# Patient Record
Sex: MALE | Race: WHITE
[De-identification: names, ages, dates, MRNs, and addresses within clinical notes are randomized per-mention and may not be internally consistent; named-entity substitution may affect disease eponyms.]

---

## 2018-05-10 ENCOUNTER — HOSPITAL ENCOUNTER (EMERGENCY)
Dept: HOSPITAL 89 - ER | Age: 63
Discharge: HOME | End: 2018-05-10
Payer: MEDICARE

## 2018-05-10 VITALS — DIASTOLIC BLOOD PRESSURE: 84 MMHG | SYSTOLIC BLOOD PRESSURE: 179 MMHG

## 2018-05-10 VITALS — WEIGHT: 315 LBS | BODY MASS INDEX: 56.58 KG/M2

## 2018-05-10 DIAGNOSIS — S91.011A: Primary | ICD-10-CM

## 2018-05-10 PROCEDURE — 99282 EMERGENCY DEPT VISIT SF MDM: CPT

## 2018-05-10 PROCEDURE — 90715 TDAP VACCINE 7 YRS/> IM: CPT

## 2018-05-10 PROCEDURE — 90471 IMMUNIZATION ADMIN: CPT

## 2018-05-10 NOTE — ER REPORT
History and Physical


Time Seen By MD:  16:46


HPI/ROS


CHIEF COMPLAINT: Laceration





HISTORY OF PRESENT ILLNESS: This is a 62-year-old male who presents to the 

emergency department for concerns of a laceration. Patient states that 

approximately 30-40 minutes prior to arrival he started bleeding from his right 

medial ankle, he wasn't sure if he lacerated his ankle on something and wasn't 

aware of it, patient states that the blood was "squirting out". Patient put a 

sock on the area wrapped it up with an Ace bandage. Upon arrival bleeding is 

controlled after the Ace wrap has been removed there is no bleeding, no 

laceration. Patient denies aches, chills, nausea, vomiting, diarrhea no chest 

pain or shortness breath.


Allergies:  


Coded Allergies:  


     morphine (Verified  Allergy, Mild, 5/10/18)


Home Meds


Active Scripts


Ibuprofen (IBUPROFEN) 800 Mg Tablet, 1 TAB PO Q8H for PAIN, #60 TAB


   Prov:LORENZA COREAS DO         8/10/16


Discontinued Reported Medications


[Blood Pressure ]   No Conflict Check


   8/9/16


Discontinued Scripts


Guaifenesin/Codeine (GUAIFENESIN-CODEINE SYRUP) 5 Ml Syrp, 5 ML PO Q6H Y for 

COUGH, #120 ML 0 Refills


   Prov:SHARLENE ACE MD         1/30/17


Past Medical/Surgical History


The patient has a past medical and surgical history of "mock stroke secondary 

to black mold", SVT cardiac ablation, hypertension, pneumonia, arthritis in the 

knees and hands, back pain, wears glasses, right knee and right wrist surgery.


Reviewed Nurses Notes:  Yes


Hx Smoking:  No


Smoking Status:  Never Smoker


Exposure to Second Hand Smoke?:  No


Hx Substance Use Disorder:  No


Hx Alcohol Use:  No


Constitutional





Vital Sign - Last 24 Hours








 5/10/18 5/10/18 5/10/18 5/10/18





 16:43 16:44 16:55 17:00


 


Temp 98.8   


 


Pulse 66  59 


 


Resp 18   


 


B/P (MAP) 181/89 181/89 (119)  170/91 (117)


 


Pulse Ox 93  93 


 


    





 5/10/18 5/10/18 5/10/18 5/10/18





 17:10 17:25 17:30 17:40


 


Pulse 55 57  55


 


B/P (MAP)   175/88 (117) 


 


Pulse Ox 92 91  93





 5/10/18 5/10/18  





 17:50 17:55  


 


Pulse  ???  


 


B/P (MAP) 179/84 (115)   








Physical Exam


   General appearance: Alert no distress.


Respiratory: Chest is non tender, lungs are clear to auscultation.


Cardiac: Regular rate and rhythm.


Integument: No laceration or open wound identified. It appears that a small 

varicose vein or spider vein ruptured and a small pinhole was bleeding however 

there is no active bleeding.








DIFFERENTIAL DIAGNOSIS: After history and physical exam differential diagnosis 

was considered for ruptured spider vein, ruptured varicose vein and laceration.





Medical Decision Making


ED Course/Re-evaluation


ED Course


The patient was admitted to room. A score obtained. On inspection there is no 

obvious laceration or trauma to the skin identified. No bleeding. We did 

monitor the area that the patient was concerned about 4 approximate 45 minutes 

no bleeding the area surrounding the wound was cleaned of the dried blood. A 

dressing was applied to the area of concern. Patient was encouraged to follow 

up with his primary care provider for any other concerns he may have. Patient 

was instructed to return to the emergency department for any other concerns or 

worsening symptoms. Patient expressed understanding and was in agreement with 

his chronic care and discharged home.


Decision to Disposition Date:  May 10, 2018


Decision to Disposition Time:  17:43





Depart


Departure


Latest Vital Signs





Vital Signs








  Date Time  Temp Pulse Resp B/P (MAP) Pulse Ox O2 Delivery O2 Flow Rate FiO2


 


5/10/18 17:55  ???      


 


5/10/18 17:50    179/84 (115)    


 


5/10/18 17:40     93   


 


5/10/18 16:43 98.8  18     








Impression:  


 Primary Impression:  


 Bleeding


Condition:  Improved


Disposition:  HOME OR SELF-CARE


Referrals:  


LISSY OSMAN (PCP)


Patient Instructions:  Acute Wound Care (ED)





Additional Instructions:  


Drink plenty of fluids.


Get plenty of rest.


Follow up with your primary care provider.


Return to the ED for any other concerns. 


Apply a pressure dressing if the wound begins to bleed again.











SARAH ALEXP-BC May 10, 2018 16:46

## 2018-09-23 ENCOUNTER — HOSPITAL ENCOUNTER (EMERGENCY)
Dept: HOSPITAL 89 - ER | Age: 63
Discharge: HOME | End: 2018-09-23
Payer: MEDICARE

## 2018-09-23 VITALS — SYSTOLIC BLOOD PRESSURE: 181 MMHG | DIASTOLIC BLOOD PRESSURE: 93 MMHG

## 2018-09-23 VITALS — WEIGHT: 315 LBS | BODY MASS INDEX: 56.58 KG/M2

## 2018-09-23 DIAGNOSIS — G51.0: Primary | ICD-10-CM

## 2018-09-23 LAB
INR PPP: 0.95
PLATELET COUNT, AUTOMATED: 344 K/UL (ref 150–450)

## 2018-09-23 PROCEDURE — 85730 THROMBOPLASTIN TIME PARTIAL: CPT

## 2018-09-23 PROCEDURE — 82565 ASSAY OF CREATININE: CPT

## 2018-09-23 PROCEDURE — 84075 ASSAY ALKALINE PHOSPHATASE: CPT

## 2018-09-23 PROCEDURE — 85610 PROTHROMBIN TIME: CPT

## 2018-09-23 PROCEDURE — 85025 COMPLETE CBC W/AUTO DIFF WBC: CPT

## 2018-09-23 PROCEDURE — 82374 ASSAY BLOOD CARBON DIOXIDE: CPT

## 2018-09-23 PROCEDURE — 70450 CT HEAD/BRAIN W/O DYE: CPT

## 2018-09-23 PROCEDURE — 84295 ASSAY OF SERUM SODIUM: CPT

## 2018-09-23 PROCEDURE — 84450 TRANSFERASE (AST) (SGOT): CPT

## 2018-09-23 PROCEDURE — 84155 ASSAY OF PROTEIN SERUM: CPT

## 2018-09-23 PROCEDURE — 82947 ASSAY GLUCOSE BLOOD QUANT: CPT

## 2018-09-23 PROCEDURE — 82247 BILIRUBIN TOTAL: CPT

## 2018-09-23 PROCEDURE — 84132 ASSAY OF SERUM POTASSIUM: CPT

## 2018-09-23 PROCEDURE — 82040 ASSAY OF SERUM ALBUMIN: CPT

## 2018-09-23 PROCEDURE — 84520 ASSAY OF UREA NITROGEN: CPT

## 2018-09-23 PROCEDURE — 82310 ASSAY OF CALCIUM: CPT

## 2018-09-23 PROCEDURE — 82435 ASSAY OF BLOOD CHLORIDE: CPT

## 2018-09-23 PROCEDURE — 99284 EMERGENCY DEPT VISIT MOD MDM: CPT

## 2018-09-23 PROCEDURE — 84460 ALANINE AMINO (ALT) (SGPT): CPT

## 2018-09-23 PROCEDURE — 71046 X-RAY EXAM CHEST 2 VIEWS: CPT

## 2018-09-23 PROCEDURE — 70030 X-RAY EYE FOR FOREIGN BODY: CPT

## 2018-09-23 NOTE — RADIOLOGY IMAGING REPORT
FACILITY: [facility] - [place], [place] 

 

PATIENT NAME: Pranav Rg

: 1955

MR: [id number]

V: [id number]

EXAM DATE: 

ORDERING PHYSICIAN: SHARLENE ACE

TECHNOLOGIST: 

 

Location: [facility] - [place]

Patient: Pranav Rg

: 1955

MRN: [id number]

Visit/Account:[id number]

Date of Sevice:  2018

 

ACCESSION #: [id number]

 

EXAMINATION: Chest 2 Views

 

HISTORY:  Shortness of breath.

 

COMPARISON:  2017.

 

FINDINGS:

Borderline cardiac enlargement with normal pulmonary vascularity.

 

The lungs are clear. No focal consolidation or pleural effusion. No pneumothorax.

 

Stable old right rib fractures. No acute osseous findings.

 

IMPRESSION:

1. Borderline cardiac enlargement, without evidence of active failure.

2. No other acute findings in the chest.

 

Report Dictated By: Jamie Schneider MD at 2018 4:39 PM

 

Report E-Signed By: Jamie Schneider MD  at 2018 4:40 PM

 

WSN:[id number]

## 2018-09-23 NOTE — RADIOLOGY IMAGING REPORT
FACILITY: [facility] 

 

PATIENT NAME: Pranav Rg

: 1955

MR: [id number]

V: [id number]

EXAM DATE: 

ORDERING PHYSICIAN: SHARLENE ACE

TECHNOLOGIST: 

 

Location: [facility]

Patient: Pranav Rg

: 1955

MRN: [id number]

Visit/Account:[id number]

Date of Sevice:  2018

 

ACCESSION #: [id number]

 

EXAMINATION: CT head without IV contrast

 

HISTORY: Right facial droop and slurred speech.

 

TECHNIQUE:   Axial CT images of the head were obtained from the vertex to the skull base without IV c
ontrast, with coronal and sagittal 2D reconstructed images.

One of the following dose optimization techniques was utilized in the performance of this exam: Autom
ated exposure control; adjustment of the mA and/or kV according to the patient's size; or use of an i
terative  reconstruction technique.  Specific details can be referenced in the facility's radiology C
T exam operational policy.

 

COMPARISON:   None.

 

FINDINGS:

The intracranial contents are unremarkable.  No CT evidence of intracranial hemorrhage, mass lesion, 
or acute infarct.  No midline shift or extra-axial fluid collections.  Gray-white differentiation is 
maintained.

 

The calvarium is intact.  The visualized paranasal sinuses and mastoid air cells are unopacified.

 

IMPRESSION:

No CT evidence of acute intracranial pathology. If there is continued clinical concern for acute isch
emia, follow-up MRI could be performed.

 

Report Dictated By: Jamie Schneider MD at 2018 3:40 PM

 

Report E-Signed By: Jamie Schneider MD  at 2018 3:46 PM

 

WSN:[id number]

## 2018-09-23 NOTE — RADIOLOGY IMAGING REPORT
[place] [facility] [place] 

 

PATIENT NAME: Pranav Rg

: 1955

MR: [id number]

V: [id number]

EXAM DATE: 

ORDERING PHYSICIAN: SHARLENE ACE

TECHNOLOGIST: 

 

Location: [facility]

Patient: Pranav Rg

: 1955

MRN: [id number]

Visit/Account:[id number]

Date of Sevice:  2018

 

ACCESSION #: [id number]

 

EXAMINATION: Single frontal view of the orbits

 

HISTORY: MRI screening

 

COMPARISON: None.

 

FINDINGS:

No metallic or other radiopaque foreign bodies are visualized in the region of the bony orbits.  Visu
alized osseous structures are unremarkable on this single view.

 

IMPRESSION:

Exam is negative for orbital metal.

 

Report Dictated By: Jamie Schneider MD at 2018 4:41 PM

 

Report E-Signed By: Jamie Schneider MD  at 2018 4:41 PM

 

WSN:[id number]

## 2018-09-23 NOTE — ER REPORT
History and Physical


Time Seen By MD:  15:07


Hx. of Stated Complaint:  


pt c/o sob, thinks it may be from smoke. c/o pain r post neck, states r side of 


face is not as strong for 5 days


HPI/ROS


CHIEF COMPLAINT: Shortness of breath and concern for facial weakness





HISTORY OF PRESENT ILLNESS: This is a 62-year-old male. He has shortness of 


breath and thinks that this is due to the smoke. He says that he has had about 


1.5 hours of facial weakness. Some mild slurred speech. He says that when trying


to drink, he has some trouble with the right corner of his mouth.. He cannot 


close his right eye tightly, but has not noted increased tearing. No fevers or 


chills. No increased cough. Has chronic problems in his legs, due to old 


injuries, but no new numbness or weakness. Has had a "mock stroke" in the past 


due to black mold, no residual problems and had normal imaging including MRI at 


that time.





REVIEW OF SYSTEMS:


As above.


Allergies:  


Coded Allergies:  


     morphine (Verified  Allergy, Mild, 9/23/18)


Home Meds


Active Scripts


Diltiazem Hcl (DILTIAZEM ER) 240 Mg Capsule.er, 240 MG PO QDAY, #60 CAP.SR.24H 0


Refills


   Prov:SHARLENE ACE MD         9/23/18


Valacyclovir Hcl (VALACYCLOVIR) 1,000 Mg Tablet, 1000 MG PO TID for 7 Days, #21 


TAB 0 Refills


   Prov:SHARLENE ACE MD         9/23/18


Prednisone (PREDNISONE) 20 Mg Tablet, 20 MG PO AS DIRECTED, #23 TAB 0 Refills


   Take 3 tablets once a day for 5 days,


   then take 2 1/2 tablets once a day for 1 day,


   then take 2 tablets once a day for 1 day,


   then take 1 1/2 tablets once a day for 1 day,


   then take 1 tablet once a day for 1 day,


   then take 1/2 tablet once a day for 1 day,


   then stop


   Prov:SHARLENE ACE MD         9/23/18


Ibuprofen (IBUPROFEN) 800 Mg Tablet, 1 TAB PO Q8H for PAIN, #60 TAB


   Prov:LORENZA COREAS DO         8/10/16


Reported Medications


Acetaminophen (TYLENOL) 325 Mg Tablet, 325 MG PO, TAB


   9/23/18


Reviewed Nurses Notes:  Yes


Hx Smoking:  No


Smoking Status:  Never Smoker


Exposure to Second Hand Smoke?:  No


Hx Substance Use Disorder:  No


Hx Alcohol Use:  No


Constitutional





Vital Sign - Last 24 Hours








 9/23/18 9/23/18 9/23/18 9/23/18





 14:52 14:53 15:00 15:18


 


Temp 98.4   


 


Pulse 89   68


 


Resp 20   21


 


B/P (MAP) 180/90 180/96 (124) 173/80 (111) 


 


Pulse Ox 95   91


 


O2 Delivery Room Air   


 


    





 9/23/18 9/23/18 9/23/18 9/23/18





 15:33 15:38 15:40 16:00


 


Pulse  81  


 


Resp  12  


 


B/P (MAP) 171/94 (119)   181/93 (122)


 


Pulse Ox  90  


 


O2 Flow Rate   2.0 





 9/23/18 9/23/18  





 16:08 17:08  


 


Pulse 61 ???  


 


Resp 15   


 


Pulse Ox 96   








Physical Exam


   General Appearance: The patient is alert. No acute distress. Non-toxic in a


ppearance.


Eyes: Pupils are equal, round. Reactive to light. No pallor, injection or 


icterus. Extraocular movements are intact. Normal visual fields. Cannot close 


the right eye tightly.


ENT: Mucous membranes are moist. Normal oral mucosa. Posterior oropharynx is 


normal. Normal nasal mucosa. Normal tympanic membranes and canals. Facial droop 


is mild on the right.


Neck: Supple and non tender.


Respiratory: Lungs are clear to auscultation.


Cardiovascular: Regular rate and rhythm. No murmurs, gallops or rubs. Normal 


capillary refill. No edema.


Gastrointestinal:  Abdomen is soft and non tender. Nondistended. Normal active 


bowel sounds.


Neurological: Alert and oriented x3. Cranial nerves with eye exam as noted 


above. Midline tongue and symmetric palate elevation. Facial droop as noted. 


Normal facial sensation. Normal strength which is equal in the extremities. 


Normal sensation in extremities as well.


Skin: Warm and dry. No rashes.


Musculoskeletal: Extremities are nontender. No tenderness in palpation of the 


cervical, thoracic and lumbar spine. Has some pain in the right base of the 


skull.





DIFFERENTIAL DIAGNOSIS: After history and physical exam, differential diagnosis 


was considered for initially with complaint of shortness of breath, but on 


further questioning, became apparent that we needed to focus on the neurologic 


deficit. Sent to CT scan for non-contrast head CT and then got the telestroke 


activated for further evaluation. Based on his symptoms, I feel that this is 


most likely a Bell's Palsy, but  will need to do NIHSS and consult neurology.





Medical Decision Making


Data Points


Result Diagram:  


9/23/18 1503                                                                    


           9/23/18 1503





Laboratory





Hematology








Test


 9/23/18


15:03


 


Red Blood Count


 5.74 M/uL


(4.00-5.60)


 


Mean Corpuscular Volume


 80.3 fL


(80.0-96.0)


 


Mean Corpuscular Hemoglobin


 26.4 pg


(26.0-33.0)


 


Mean Corpuscular Hemoglobin


Concent 33.0 g/dL


(32.0-36.0)


 


Red Cell Distribution Width


 14.6 %


(11.5-14.5)


 


Mean Platelet Volume


 8.7 fL


(7.2-11.1)


 


Neutrophils (%) (Auto)


 65.7 %


(39.4-72.5)


 


Lymphocytes (%) (Auto)


 22.7 %


(17.6-49.6)


 


Monocytes (%) (Auto)


 7.7 %


(4.1-12.4)


 


Eosinophils (%) (Auto)


 2.8 %


(0.4-6.7)


 


Basophils (%) (Auto)


 1.1 %


(0.3-1.4)


 


Nucleated RBC Relative Count


(auto) 0.1 /100WBC 





 


Neutrophils # (Auto)


 5.6 K/uL


(2.0-7.4)


 


Lymphocytes # (Auto)


 1.9 K/uL


(1.3-3.6)


 


Monocytes # (Auto)


 0.7 K/uL


(0.3-1.0)


 


Eosinophils # (Auto)


 0.2 K/uL


(0.0-0.5)


 


Basophils # (Auto)


 0.1 K/uL


(0.0-0.1)


 


Nucleated RBC Absolute Count


(auto) 0.01 K/uL 





 


Prothrombin Time


 12.7 seconds


(12.0-14.4)


 


Prothromb Time International


Ratio 0.95 





 


Activated Partial


Thromboplast Time 28 seconds


(23-35)


 


Sodium Level


 141 mmol/L


(137-145)


 


Potassium Level


 3.5 mmol/L


(3.5-5.0)


 


Chloride Level


 101 mmol/L


()


 


Carbon Dioxide Level


 27 mmol/L


(22-30)


 


Blood Urea Nitrogen 7 mg/dl (9-21) 


 


Creatinine


 0.60 mg/dl


(0.66-1.25)


 


Glomerular Filtration Rate


Calc > 60.0 





 


Random Glucose


 102 mg/dl


()


 


Calcium Level


 8.9 mg/dl


(8.4-10.2)


 


Total Bilirubin


 0.6 mg/dl


(0.2-1.3)


 


Aspartate Amino Transf


(AST/SGOT) 28 U/L (0-35) 





 


Alanine Aminotransferase


(ALT/SGPT) 26 U/L (0-56) 





 


Alkaline Phosphatase 66 U/L (0-126) 


 


Total Protein


 8.3 g/dl


(6.3-8.2)


 


Albumin


 4.3 g/dl


(3.5-5.0)








Chemistry








Test


 9/23/18


15:03


 


White Blood Count


 8.6 k/uL


(4.5-11.0)


 


Red Blood Count


 5.74 M/uL


(4.00-5.60)


 


Hemoglobin


 15.2 g/dL


(14.0-18.0)


 


Hematocrit


 46.1 %


(42.0-52.0)


 


Mean Corpuscular Volume


 80.3 fL


(80.0-96.0)


 


Mean Corpuscular Hemoglobin


 26.4 pg


(26.0-33.0)


 


Mean Corpuscular Hemoglobin


Concent 33.0 g/dL


(32.0-36.0)


 


Red Cell Distribution Width


 14.6 %


(11.5-14.5)


 


Platelet Count


 344 K/uL


(150-450)


 


Mean Platelet Volume


 8.7 fL


(7.2-11.1)


 


Neutrophils (%) (Auto)


 65.7 %


(39.4-72.5)


 


Lymphocytes (%) (Auto)


 22.7 %


(17.6-49.6)


 


Monocytes (%) (Auto)


 7.7 %


(4.1-12.4)


 


Eosinophils (%) (Auto)


 2.8 %


(0.4-6.7)


 


Basophils (%) (Auto)


 1.1 %


(0.3-1.4)


 


Nucleated RBC Relative Count


(auto) 0.1 /100WBC 





 


Neutrophils # (Auto)


 5.6 K/uL


(2.0-7.4)


 


Lymphocytes # (Auto)


 1.9 K/uL


(1.3-3.6)


 


Monocytes # (Auto)


 0.7 K/uL


(0.3-1.0)


 


Eosinophils # (Auto)


 0.2 K/uL


(0.0-0.5)


 


Basophils # (Auto)


 0.1 K/uL


(0.0-0.1)


 


Nucleated RBC Absolute Count


(auto) 0.01 K/uL 





 


Prothrombin Time


 12.7 seconds


(12.0-14.4)


 


Prothromb Time International


Ratio 0.95 





 


Activated Partial


Thromboplast Time 28 seconds


(23-35)


 


Glomerular Filtration Rate


Calc > 60.0 





 


Calcium Level


 8.9 mg/dl


(8.4-10.2)


 


Total Bilirubin


 0.6 mg/dl


(0.2-1.3)


 


Aspartate Amino Transf


(AST/SGOT) 28 U/L (0-35) 





 


Alanine Aminotransferase


(ALT/SGPT) 26 U/L (0-56) 





 


Alkaline Phosphatase 66 U/L (0-126) 


 


Total Protein


 8.3 g/dl


(6.3-8.2)


 


Albumin


 4.3 g/dl


(3.5-5.0)








Coagulation








Test


 9/23/18


15:03


 


Prothrombin Time 12.7 seconds 


 


Prothromb Time International


Ratio 0.95 





 


Activated Partial


Thromboplast Time 28 seconds 














EKG/Imaging


Imaging


EXAMINATION: CT head without IV contrast


 


HISTORY: Right facial droop and slurred speech.


 


TECHNIQUE:   Axial CT images of the head were obtained from the vertex to the 


skull base without IV contrast, with coronal and sagittal 2D reconstructed 


images.


One of the following dose optimization techniques was utilized in the 


performance of this exam: Automated exposure control; adjustment of the mA 


and/or kV according to the patient's size; or use of an iterative  


reconstruction technique.  Specific details can be referenced in the facility's 


radiology CT exam operational policy.


 


COMPARISON:   None.


 


FINDINGS:


The intracranial contents are unremarkable.  No CT evidence of intracranial 


hemorrhage, mass lesion, or acute infarct.  No midline shift or extra-axial 


fluid collections.  Gray-white differentiation is maintained.


 


The calvarium is intact.  The visualized paranasal sinuses and mastoid air cells


are unopacified.


 


IMPRESSION:


No CT evidence of acute intracranial pathology. If there is continued clinical 


concern for acute ischemia, follow-up MRI could be performed.


 


Report Dictated By: Jamie Schneider MD at 9/23/2018 3:40 PM








EXAMINATION: Chest 2 Views


 


HISTORY:  Shortness of breath.


 


COMPARISON:  1/30/2017.


 


FINDINGS:


Borderline cardiac enlargement with normal pulmonary vascularity.


 


The lungs are clear. No focal consolidation or pleural effusion. No 


pneumothorax.


 


Stable old right rib fractures. No acute osseous findings.


 


IMPRESSION:


1. Borderline cardiac enlargement, without evidence of active failure.


2. No other acute findings in the chest.


 


Report Dictated By: Jamie Schneider MD at 9/23/2018 4:39 PM





ED Course/Re-evaluation


Clinical Indication for ER IV:  Hydration, IV Access


ED Course


The patient had the CT scan done and then I performed an NIH stroke scale with 


Dr. Lemus on the Telestroke device from [facility]. After this was 


done, scored one point for the slight loss of nasolabial fold. Dr. Lemus agreed 


that this looked like an early [place] Palsy. We discussed using steroid to treat 


this. Discussed the option of performing an MRI and after discussion with the 


patient, we did go ahead and order an MRI of the brain without contrast, but the


patient was unable to fit in the MRI due to body size. We are fairly certain thi


s is [place] Palsy, so went ahead with treatment. We did talk about where open MRI


is available should the patient want to do this. Started Prednisone and 


Valacyclovir. Discussed treatment and protection of the eye as well.


Decision to Disposition Date:  Sep 23, 2018


Decision to Disposition Time:  16:59





Depart


Departure


Latest Vital Signs





Vital Signs








  Date Time  Temp Pulse Resp B/P (MAP) Pulse Ox O2 Delivery O2 Flow Rate FiO2


 


9/23/18 17:08  ???      


 


9/23/18 16:08   15  96   


 


9/23/18 16:00    181/93 (122)    


 


9/23/18 15:40       2.0 


 


9/23/18 14:52 98.4     Room Air  








Impression:  


   Primary Impression:  


   Bell's palsy


Condition:  Improved


Disposition:  HOME OR SELF-CARE


Referrals:  


LISSY OSMANP (PCP)


New Scripts


Diltiazem Hcl (DILTIAZEM ER) 240 Mg Capsule.er


240 MG PO QDAY, #60 CAP.SR.24H 0 Refills


   Prov: SHARLENE ACE MD         9/23/18 


Valacyclovir Hcl (VALACYCLOVIR) 1,000 Mg Tablet


1000 MG PO TID for 7 Days, #21 TAB 0 Refills


   Prov: SHARLENE ACE MD         9/23/18 


Prednisone (PREDNISONE) 20 Mg Tablet


20 MG PO AS DIRECTED, #23 TAB 0 Refills


   Take 3 tablets once a day for 5 days,


   then take 2 1/2 tablets once a day for 1 day,


   then take 2 tablets once a day for 1 day,


   then take 1 1/2 tablets once a day for 1 day,


   then take 1 tablet once a day for 1 day,


   then take 1/2 tablet once a day for 1 day,


   then stop


   Prov: SHARLENE ACE MD         9/23/18


Patient Instructions:  [place] Palsy (ED)





Additional Instructions:  


Start Prednisone: 20mg tablets, take 3 tablets once a day for 5 days, then 2 1/2


tablets for 1 days, then 2 tablets for 1 day, then 1 1/2 tablets for 1 days, 


then 1 tablet for 1 days, then 1/2 tablet for 1 day, then stop. (23 tablets)


Take the antiviral medication Valacyclovir 1000mg three times a day for 1 week.


The droop of the eyelid and incomplete closure can cause eye problems. We 


recommend using lubricating eye drops throughout the day. You can also consider 


using drops with a patch as well, especially at night to protect the eye.


Please follow-up with your regular doctor for follow-up. Would recommend seeing 


them in the next 1-2 weeks.











SHARLENE ACE MD             Sep 23, 2018 15:07

## 2018-11-14 ENCOUNTER — HOSPITAL ENCOUNTER (EMERGENCY)
Dept: HOSPITAL 89 - ER | Age: 63
Discharge: HOME | End: 2018-11-14
Payer: MEDICARE

## 2018-11-14 VITALS — DIASTOLIC BLOOD PRESSURE: 76 MMHG | SYSTOLIC BLOOD PRESSURE: 135 MMHG

## 2018-11-14 VITALS — BODY MASS INDEX: 56.58 KG/M2

## 2018-11-14 DIAGNOSIS — M17.11: Primary | ICD-10-CM

## 2018-11-14 PROCEDURE — 99284 EMERGENCY DEPT VISIT MOD MDM: CPT

## 2018-11-14 NOTE — ER REPORT
History and Physical


Time Seen By MD:  12:00


Hx. of Stated Complaint:  


PATIENT REPORTS THAT HE HAS BEEN HAVING KNEE PROBLEMS SINCE 1995. HE REPORTS 


THAT ON SUNDAY HIS RIGHT KNEE STARTED SWELLING AND IS CAUSING HIM QUITE A BIT OF





PAIN


HPI/ROS


CHIEF COMPLAINT: Right leg pain





HISTORY OF PRESENT ILLNESS: 8-year-old morbidly obese male long history of 


chronic knee pain comes emergency Department today with complaint of pain behind


his right knee and down and this This going on for the last 3-4 days patient 


states the pain is dull and aching occasionally sharp and stabbing localized to 


the right posterior popliteal fossa and the right calf area patient denies any 


falls or trauma says he ambulates but not as much as he used to due to his 


obesity and chronic knee pain A she has no history of falls or recent trauma no 


additional complaints noted





REVIEW OF SYSTEMS:


Respiratory: No cough, no dyspnea.


Cardiovascular: No chest pain, no palpitations.


Gastrointestinal: No vomiting, no abdominal pain.


Musculoskeletal: Right knee pain


Remainder of the 14 system rev:  Yes


Allergies:  


Coded Allergies:  


     morphine (Verified  Allergy, Mild, 9/23/18)


Home Meds


Active Scripts


Diltiazem Hcl (DILTIAZEM ER) 240 Mg Capsule.er, 240 MG PO QDAY, #60 CAP.SR.[id number] 0


Refills


   Prov:SHARLENE ACE MD         9/23/18


Valacyclovir Hcl (VALACYCLOVIR) 1,000 Mg Tablet, 1000 MG PO TID for 7 Days, #21 


TAB 0 Refills


   Prov:SHARLENE ACE MD         9/23/18


Prednisone (PREDNISONE) 20 Mg Tablet, 20 MG PO AS DIRECTED, #23 TAB 0 Refills


   Take 3 tablets once a day for 5 days,


   then take 2 1/2 tablets once a day for 1 day,


   then take 2 tablets once a day for 1 day,


   then take 1 1/2 tablets once a day for 1 day,


   then take 1 tablet once a day for 1 day,


   then take 1/2 tablet once a day for 1 day,


   then stop


   Prov:SHARLENE ACE MD         9/23/18


Ibuprofen (IBUPROFEN) 800 Mg Tablet, 1 TAB PO Q8H for PAIN, #60 TAB


   Prov:LORENZA COREAS DO         8/10/16


Reported Medications


Acetaminophen (TYLENOL) 325 Mg Tablet, 325 MG PO, TAB


   9/23/18


Reviewed Nurses Notes:  Yes


Old Medical Records Reviewed:  Yes


Hx Smoking:  No


Smoking Status:  Never Smoker


Exposure to Second Hand Smoke?:  No


Hx Substance Use Disorder:  No


Hx Alcohol Use:  No


Constitutional





Vital Sign - Last 24 Hours








 11/14/18 11/14/18





 11:53 12:00


 


Temp  98.1


 


Pulse 74 


 


Resp 20 


 


B/P (MAP) 102/84 


 


Pulse Ox 95 


 


O2 Delivery Room Air 








Physical Exam


   General appearance: Alert no distress.


Respiratory: Chest is non tender, lungs are clear to auscultation.


Cardiac: Regular rate and rhythm [ ]


Right leg examination patient with no pitting edema patient has pain with 


flexion and extension of the right knee neurovascularly intact pain with 


palpation to the popliteal fossa on the no firmness Diameter appears apparent no


additional findings of note


DIFFERENTIAL DIAGNOSIS: After history and physical exam differential diagnosis 


was considered for ultrasound chronic knee pain with radiculopathy DVT





Medical Decision Making


ED Course/Re-evaluation


ED Course


ED clinical course 8-year-old male with right posterior popliteal and calf pain 


ultrasound was negative no indication for emergent imaging as his story has no 


history of trauma his chronic knee discomfort with this is just an exacerbation 


of chronic underlying degenerative joint disease follow-up with orthopedics


Decision to Disposition Date:  Nov 14, 2018


Decision to Disposition Time:  12:47





Depart


Departure


Latest Vital Signs





Vital Signs








  Date Time  Temp Pulse Resp B/P (MAP) Pulse Ox O2 Delivery O2 Flow Rate FiO2


 


11/14/18 12:00 98.1       


 


11/14/18 11:53  74 20 102/84 95 Room Air  








Impression:  


   Primary Impression:  


   Degenerative joint disease


Condition:  Improved


Disposition:  HOME OR SELF-CARE


Referrals:  


LISSY OSMAN (PCP)











ASHLEIGH NUNEZ MD


5 Days


Patient Instructions:  Knee Pain (ED)











SCAR GAMEZ MD           Nov 14, 2018 12:15

## 2018-11-14 NOTE — RADIOLOGY IMAGING REPORT
FACILITY: [facility] [place] 

 

PATIENT NAME: Pranav Rg

: 1955

MR: [id number]

V: [id number]

EXAM DATE: 

ORDERING PHYSICIAN: SCAR GAMEZ

TECHNOLOGIST: 

 

Location: [facility]

Patient: Pranav Rg

: 1955

MRN: [id number]

Visit/Account:[id number]

Date of Sevice: 2018

 

ACCESSION #: [id number]

 

VENOUS DOPP LOW RIGHT EXTREMIT

 

HISTORY:  Right leg pain, numbness

 

COMPARISON:  None.

 

FINDINGS:

Grayscale, duplex and color Doppler interrogation of the right lower extremity deep veins from common
 femoral vein to proximal calf was completed. The greater saphenous vein in the right proximal thigh 
was evaluated using similar technique.

 

Common femoral vein - Negative.

Femoral vein - Negative.

Deep femoral vein - Negative.

Popliteal vein - Negative.

Visualized deep calf veins - Negative.

 

Popliteal fossa: Negative.

Greater saphenous vein in the proximal thigh: Negative.

 

IMPRESSION:

1.  No evidence for right lower extremity DVT.

 

Report Dictated By: Thomas Dunphy, MD at 2018 12:59 PM

 

Report E-Signed By: Thomas Dunphy, MD  at 2018 1:03 PM

 

WSN:[id number]

## 2019-04-04 ENCOUNTER — HOSPITAL ENCOUNTER (EMERGENCY)
Dept: HOSPITAL 89 - ER | Age: 64
Discharge: HOME | End: 2019-04-04
Payer: MEDICARE

## 2019-04-04 VITALS — DIASTOLIC BLOOD PRESSURE: 80 MMHG | SYSTOLIC BLOOD PRESSURE: 167 MMHG

## 2019-04-04 VITALS — BODY MASS INDEX: 56.58 KG/M2 | WEIGHT: 315 LBS

## 2019-04-04 DIAGNOSIS — R79.89: ICD-10-CM

## 2019-04-04 DIAGNOSIS — M25.561: ICD-10-CM

## 2019-04-04 DIAGNOSIS — M25.562: Primary | ICD-10-CM

## 2019-04-04 DIAGNOSIS — G89.29: ICD-10-CM

## 2019-04-04 LAB — PLATELET COUNT, AUTOMATED: 276 K/UL (ref 150–450)

## 2019-04-04 PROCEDURE — 84450 TRANSFERASE (AST) (SGOT): CPT

## 2019-04-04 PROCEDURE — 85379 FIBRIN DEGRADATION QUANT: CPT

## 2019-04-04 PROCEDURE — 82310 ASSAY OF CALCIUM: CPT

## 2019-04-04 PROCEDURE — 85025 COMPLETE CBC W/AUTO DIFF WBC: CPT

## 2019-04-04 PROCEDURE — 82374 ASSAY BLOOD CARBON DIOXIDE: CPT

## 2019-04-04 PROCEDURE — 82040 ASSAY OF SERUM ALBUMIN: CPT

## 2019-04-04 PROCEDURE — 84132 ASSAY OF SERUM POTASSIUM: CPT

## 2019-04-04 PROCEDURE — 82947 ASSAY GLUCOSE BLOOD QUANT: CPT

## 2019-04-04 PROCEDURE — 83880 ASSAY OF NATRIURETIC PEPTIDE: CPT

## 2019-04-04 PROCEDURE — 99284 EMERGENCY DEPT VISIT MOD MDM: CPT

## 2019-04-04 PROCEDURE — 82565 ASSAY OF CREATININE: CPT

## 2019-04-04 PROCEDURE — 84155 ASSAY OF PROTEIN SERUM: CPT

## 2019-04-04 PROCEDURE — 84460 ALANINE AMINO (ALT) (SGPT): CPT

## 2019-04-04 PROCEDURE — 93970 EXTREMITY STUDY: CPT

## 2019-04-04 PROCEDURE — 82247 BILIRUBIN TOTAL: CPT

## 2019-04-04 PROCEDURE — 82435 ASSAY OF BLOOD CHLORIDE: CPT

## 2019-04-04 PROCEDURE — 84295 ASSAY OF SERUM SODIUM: CPT

## 2019-04-04 PROCEDURE — 84520 ASSAY OF UREA NITROGEN: CPT

## 2019-04-04 PROCEDURE — 84075 ASSAY ALKALINE PHOSPHATASE: CPT

## 2019-04-04 NOTE — RADIOLOGY IMAGING REPORT
FACILITY: [facility] 

 

PATIENT NAME: Pranav Rg

: 1955

MR: [id number]

V: [id number]

EXAM DATE: 

ORDERING PHYSICIAN: MARY COREA

TECHNOLOGIST: 

 

[place] [facility] [place]: Pranav Rg

: 1955

MRN: [id number]

Visit/Account:[id number]

Date of Sevice:  2019

 

ACCESSION #: [id number]

 

US VENOGRAM EXTREMITY, BILATERAL

 

ADDITIONAL PERTINENT HISTORY:  Bilateral popliteal pain with right thigh pain..

 

COMPARISON STUDIES:   None.

 

FINDINGS:

Grayscale compression, duplex and color Doppler interrogation of the bilateral lower extremity deep v
eins from common femoral vein to proximal calf was performed. The greater saphenous vein in the ipsil
ateral proximal thigh was evaluated using similar technique.

 

Bilateral lower extremity:

Common femoral vein:  Negative.

Femoral vein:  Negative.

Deep femoral vein:  Negative.

Popliteal vein:  Negative.

Visualized deep calf veins  Negative.

 

Greater saphenous vein in the proximal thigh:  Negative.

Popliteal fossa: Anechoic structure measuring 2.4 x 2.8 x 3.5 cm in the region of the left popliteal 
fossa compatible with a small Baker's cyst cyst

 

IMPRESSION:

1. No evidence of deep venous thrombosis involving the bilateral lower extremity.

2. Left-sided Baker's cyst as detailed above.

 

Report Dictated By: Zenon Pettit MD at 2019 3:19 AM

 

Report E-Signed By: Zenon Pettit MD  at 2019 3:21 AM

 

WSN:[id number]

## 2019-04-04 NOTE — RADIOLOGY IMAGING REPORT
FACILITY: [facility] [place] 

 

PATIENT NAME: Pranav Rg

: 1955

MR: [id number]

V: [id number]

EXAM DATE: 

ORDERING PHYSICIAN: MARY COREA

TECHNOLOGIST: 

 

Location: [facility] [place]

Patient: Pranav Rg

: 1955

MRN: [id number]

Visit/Account:[id number]

Date of Sevice:  2019

 

ACCESSION #: [id number]

 

 

Bilateral knees:

 

Indication: Chronic pain.

Technique: 3 views of each knee were obtained.

Comparison: 2016

 

Findings: There is no evidence of fracture, dislocation, or other acute deformity. There is moderate/
marked osteoarthritic joint space narrowing in both knees, right slightly worse than left. Marginal o
steophyte formation is evident on both sides. There is normal mineralization of the skeletal structur
es. There is no evidence of erosion or bone destruction. No joint effusion or periarticular soft tiss
ue abnormality is identified.

 

IMPRESSION: Bilateral moderate/marked osteoarthritis. No acute deformity.

 

Report Dictated By: Keven Conde MD at 2019 2:10 AM

 

Report E-Signed By: Keven Conde MD  at 2019 2:16 AM

 

WSN:[id number]

## 2019-04-05 ENCOUNTER — HOSPITAL ENCOUNTER (OUTPATIENT)
Dept: HOSPITAL 89 - US | Age: 64
End: 2019-04-05
Attending: INTERNAL MEDICINE
Payer: MEDICARE

## 2019-04-05 VITALS — BODY MASS INDEX: 56.58 KG/M2

## 2019-04-05 DIAGNOSIS — I51.7: Primary | ICD-10-CM

## 2019-04-05 PROCEDURE — 93306 TTE W/DOPPLER COMPLETE: CPT

## 2019-04-19 ENCOUNTER — HOSPITAL ENCOUNTER (OUTPATIENT)
Dept: HOSPITAL 89 - LAB | Age: 64
End: 2019-04-19
Attending: NURSE PRACTITIONER
Payer: MEDICARE

## 2019-04-19 VITALS — BODY MASS INDEX: 56.58 KG/M2

## 2019-04-19 DIAGNOSIS — I51.7: ICD-10-CM

## 2019-04-19 DIAGNOSIS — I10: Primary | ICD-10-CM

## 2019-04-19 DIAGNOSIS — I50.30: ICD-10-CM

## 2019-04-19 DIAGNOSIS — R60.0: ICD-10-CM

## 2019-04-19 DIAGNOSIS — Z79.899: ICD-10-CM

## 2019-04-19 PROCEDURE — 84132 ASSAY OF SERUM POTASSIUM: CPT

## 2019-04-19 PROCEDURE — 84450 TRANSFERASE (AST) (SGOT): CPT

## 2019-04-19 PROCEDURE — 84075 ASSAY ALKALINE PHOSPHATASE: CPT

## 2019-04-19 PROCEDURE — 82435 ASSAY OF BLOOD CHLORIDE: CPT

## 2019-04-19 PROCEDURE — 84460 ALANINE AMINO (ALT) (SGPT): CPT

## 2019-04-19 PROCEDURE — 82565 ASSAY OF CREATININE: CPT

## 2019-04-19 PROCEDURE — 82310 ASSAY OF CALCIUM: CPT

## 2019-04-19 PROCEDURE — 82040 ASSAY OF SERUM ALBUMIN: CPT

## 2019-04-19 PROCEDURE — 82247 BILIRUBIN TOTAL: CPT

## 2019-04-19 PROCEDURE — 84155 ASSAY OF PROTEIN SERUM: CPT

## 2019-04-19 PROCEDURE — 82947 ASSAY GLUCOSE BLOOD QUANT: CPT

## 2019-04-19 PROCEDURE — 36415 COLL VENOUS BLD VENIPUNCTURE: CPT

## 2019-04-19 PROCEDURE — 82374 ASSAY BLOOD CARBON DIOXIDE: CPT

## 2019-04-19 PROCEDURE — 84295 ASSAY OF SERUM SODIUM: CPT

## 2019-04-19 PROCEDURE — 84520 ASSAY OF UREA NITROGEN: CPT

## 2019-04-23 ENCOUNTER — HOSPITAL ENCOUNTER (OUTPATIENT)
Dept: HOSPITAL 89 - RESP | Age: 64
End: 2019-04-23
Attending: INTERNAL MEDICINE
Payer: MEDICARE

## 2019-04-23 VITALS — BODY MASS INDEX: 56.58 KG/M2

## 2019-04-23 DIAGNOSIS — I48.3: Primary | ICD-10-CM

## 2019-04-23 PROCEDURE — 93226 XTRNL ECG REC<48 HR SCAN A/R: CPT

## 2019-04-23 PROCEDURE — 93225 XTRNL ECG REC<48 HRS REC: CPT

## 2019-04-26 NOTE — RT HOLTER TEST
FACILITY: [facility] 

 

PATIENT NAME: PAZ POWELL

: [id number]

MR: [id number]

V: [id number]

EXAM DATE: 

ORDERING PHYSICIAN: YAKELIN BARROSO

TECHNOLOGIST: JULIO

 

Hook-up date: 2019   13:30:00       Duration: 47:59:00

Test Indications: 

Medications: 

              

              

              

              

              

              

              

[id number] QRS complexes

   349 Ventricular      ectopics which represent    <1 % of total QRS comp.

   295 Supraventricular ectopics which represent    <1 % of total QRS comp.

     * Paced QRS complexes       which represent  **** % of total QRS comp.

VENTRICULAR ECTOPY

   345 Isolated

    47 Bigeminal Cycles

     2 Couplets

     0 Runs

     0 Beats in Runs

     * Beats LONGEST at   *  BPM at **:**:** ****-**-**

     * Beats FASTEST at   *  BPM at **:**:** ****-**-**

SUPRAVENTRICULAR ECTOPY

   239 Isolated

    15 Couplets

     7 Runs

    26 Beats in Runs

     7 Beats LONGEST at  68  BPM at 09:53:39 2019

     3 Beats FASTEST at 116  BPM at 14:18:22 2019

HEART RATES

    39 MIN at 08:04:21 2019

    60 AVG

   108 MAX at 23:29:36 2019

LONGEST RR

     1.800 secs at 07:36:05 2019

S-T LEVELS

     Channel 1

          -12.800 mm MIN at 13:30:00 2019

          -12.800 mm MAX at 13:30:00 2019

     Channel 2

          -12.800 mm MIN at 13:30:00 2019

          -12.800 mm MAX at 13:30:00 2019

     Channel 3

          -12.800 mm MIN at 13:30:00 2019

          -12.800 mm MAX at 13:30:00 2019

 

Study dominated by sinus rhythm.

Infrequent PVC occuring in isolation and bigeminy.

Infrequent SVE  occuring in primarily in isolation, run up to 7 beats noted.

Negative holter monitor.

Confirmed by Honorio Carr ([id number]) on 2019 6:33:43 AM

 

Referred By:             Overread By: Honorio Christiansen

## 2019-06-19 ENCOUNTER — HOSPITAL ENCOUNTER (OUTPATIENT)
Dept: HOSPITAL 89 - NUC | Age: 64
End: 2019-06-19
Attending: INTERNAL MEDICINE
Payer: MEDICARE

## 2019-06-19 VITALS — BODY MASS INDEX: 56.58 KG/M2

## 2019-06-19 DIAGNOSIS — R94.39: Primary | ICD-10-CM

## 2019-06-19 PROCEDURE — 93017 CV STRESS TEST TRACING ONLY: CPT

## 2019-06-19 PROCEDURE — 78452 HT MUSCLE IMAGE SPECT MULT: CPT

## 2019-06-19 NOTE — RADIOLOGY IMAGING REPORT
FACILITY: [facility] [place] 

 

PATIENT NAME: Pranav Rg

: 1955

MR: [id number]

V: [id number]

EXAM DATE: [id number]

ORDERING PHYSICIAN: YAKELIN BARROSO

TECHNOLOGIST: 

 

Location: [facility]

Patient: Pranav Rg

: 1955

MRN: [id number]

Visit/Account:[id number]

Date of Sevice:  2019

 

ACCESSION #: [id number]

 

EXAMINATION: Single Isotope SPECT Imaging with Exercise and Gated SPECT Imaging

 

DATE OF EXAMINATION:  2019

 

DATE OF INTERPRETATION:  2019

 

REQUESTING PHYSICIAN:  YAKELIN BARROSO

 

INDICATION:  The patient is a 63-year-old male evaluated for dyspnea.

 

PROCEDURE:  After informed consent the patient received an intravenous injection of  9.9 mCi of Tc-99
m sestamibi followed at the appropriate time interval by rest imaging.  The patient then exercised ac
cording to the standard Zack protocol for 2:58 minutes achieving 4 METS.  Resting heart rate was 78 
bpm with a peak heart rate of 144 bpm which  is 90 % of maximal predicted heart rate for age.  Blood 
pressure at rest was 126 / 89; blood pressure during exercise was 154 / 108.  There was no chest pain
 during exercise.  Exercise was discontinued because of fatigue.  Baseline EKG demonstrates nonspecif
ic ST changes at baseline.  There were no new EKG changes of ischemia at peak exercise.  Approximatel
y one minute and 30 seconds prior to the termination of exercise, the patient received an intravenous
 injection of 26.9 mCi of Tc-99m sestamibi followed by stress imaging.

 

RAW DATA:  Examination of the summed raw data revealed a fair quality study.

 

MYOCARDIAL PERFUSION:  The tomographic images demonstrate small, mild inferior defect at rest which r
emains mild to becomes more moderate in size with stress suggesting infarct with phillip-infarct ischemi
a.

 

 

GATED IMAGES:  The gated images demonstrate normal wall motion, ejection fraction 62%

 

 

IMPRESSION:

1.  Fair quality study

2.  Abnormal myocardial perfusion scan. Imaging suggests prior, small RCA infarct with mild phillip-infa
rct ischemia. Images highly suggestive of diaphragmatic attenuation but no prone imaging done to excl
ude this possibility.

3.  Normal LV systolic function; LVEF 62%.

4. Based on the results of this exam, the patient appears to be at low risk for future cardiovascular
 events but remains intermediate risk given the suggestion of prior CAD with phillip-infarct ischemia.

 

Report Dictated By: Dov Kaminski at 2019 3:30 PM

 

Report E-Signed By: Dov Kaminski  at 2019 3:44 PM

 

WSN:[id number]

## 2019-07-10 ENCOUNTER — HOSPITAL ENCOUNTER (OUTPATIENT)
Dept: HOSPITAL 89 - LAB | Age: 64
End: 2019-07-10
Attending: INTERNAL MEDICINE
Payer: MEDICARE

## 2019-07-10 ENCOUNTER — HOSPITAL ENCOUNTER (OUTPATIENT)
Dept: HOSPITAL 89 - LAB | Age: 64
End: 2019-07-10
Attending: NURSE PRACTITIONER
Payer: MEDICARE

## 2019-07-10 VITALS — BODY MASS INDEX: 56.58 KG/M2 | BODY MASS INDEX: 56.58 KG/M2

## 2019-07-10 DIAGNOSIS — I10: Primary | ICD-10-CM

## 2019-07-10 DIAGNOSIS — I10: ICD-10-CM

## 2019-07-10 DIAGNOSIS — M10.9: Primary | ICD-10-CM

## 2019-07-10 LAB — LDLC SERPL-MCNC: 131 MG/DL

## 2019-07-10 PROCEDURE — 82247 BILIRUBIN TOTAL: CPT

## 2019-07-10 PROCEDURE — 84460 ALANINE AMINO (ALT) (SGPT): CPT

## 2019-07-10 PROCEDURE — 84478 ASSAY OF TRIGLYCERIDES: CPT

## 2019-07-10 PROCEDURE — 36415 COLL VENOUS BLD VENIPUNCTURE: CPT

## 2019-07-10 PROCEDURE — 84450 TRANSFERASE (AST) (SGOT): CPT

## 2019-07-10 PROCEDURE — 83718 ASSAY OF LIPOPROTEIN: CPT

## 2019-07-10 PROCEDURE — 82374 ASSAY BLOOD CARBON DIOXIDE: CPT

## 2019-07-10 PROCEDURE — 82310 ASSAY OF CALCIUM: CPT

## 2019-07-10 PROCEDURE — 82040 ASSAY OF SERUM ALBUMIN: CPT

## 2019-07-10 PROCEDURE — 84550 ASSAY OF BLOOD/URIC ACID: CPT

## 2019-07-10 PROCEDURE — 84075 ASSAY ALKALINE PHOSPHATASE: CPT

## 2019-07-10 PROCEDURE — 84132 ASSAY OF SERUM POTASSIUM: CPT

## 2019-07-10 PROCEDURE — 82947 ASSAY GLUCOSE BLOOD QUANT: CPT

## 2019-07-10 PROCEDURE — 84155 ASSAY OF PROTEIN SERUM: CPT

## 2019-07-10 PROCEDURE — 82435 ASSAY OF BLOOD CHLORIDE: CPT

## 2019-07-10 PROCEDURE — 84295 ASSAY OF SERUM SODIUM: CPT

## 2019-07-10 PROCEDURE — 82465 ASSAY BLD/SERUM CHOLESTEROL: CPT

## 2019-07-10 PROCEDURE — 84520 ASSAY OF UREA NITROGEN: CPT

## 2019-07-10 PROCEDURE — 82565 ASSAY OF CREATININE: CPT
